# Patient Record
Sex: FEMALE | Race: BLACK OR AFRICAN AMERICAN | Employment: OTHER | URBAN - METROPOLITAN AREA
[De-identification: names, ages, dates, MRNs, and addresses within clinical notes are randomized per-mention and may not be internally consistent; named-entity substitution may affect disease eponyms.]

---

## 2020-06-17 PROBLEM — C64.1 MALIGNANT NEOPLASM OF RIGHT KIDNEY (HCC): Status: ACTIVE | Noted: 2020-06-17

## 2020-06-17 PROBLEM — N20.0 KIDNEY STONE: Status: ACTIVE | Noted: 2020-06-17

## 2020-12-09 PROBLEM — D35.02 ADRENAL ADENOMA, LEFT: Status: ACTIVE | Noted: 2020-12-09

## 2020-12-09 PROBLEM — D25.9 UTERINE FIBROID: Status: ACTIVE | Noted: 2020-12-09

## 2020-12-09 PROBLEM — M81.0 OSTEOPOROSIS: Status: ACTIVE | Noted: 2018-05-22

## 2020-12-09 PROBLEM — S92.909A FOOT FRACTURE: Status: ACTIVE | Noted: 2020-12-09

## 2020-12-09 PROBLEM — Z90.5 S/P NEPHRECTOMY: Status: ACTIVE | Noted: 2017-04-21

## 2020-12-09 PROBLEM — I10 ESSENTIAL HYPERTENSION, BENIGN: Status: ACTIVE | Noted: 2018-05-22

## 2020-12-10 ENCOUNTER — VIRTUAL VISIT (OUTPATIENT)
Dept: UROLOGY | Age: 70
End: 2020-12-10
Payer: COMMERCIAL

## 2020-12-10 DIAGNOSIS — C64.1 MALIGNANT NEOPLASM OF RIGHT KIDNEY (HCC): Primary | ICD-10-CM

## 2020-12-10 DIAGNOSIS — N20.0 KIDNEY STONE: ICD-10-CM

## 2020-12-10 PROBLEM — K86.9 PANCREATIC LESION: Status: ACTIVE | Noted: 2020-12-10

## 2020-12-10 PROCEDURE — 99213 OFFICE O/P EST LOW 20 MIN: CPT | Performed by: UROLOGY

## 2020-12-10 RX ORDER — LISINOPRIL AND HYDROCHLOROTHIAZIDE 12.5; 2 MG/1; MG/1
TABLET ORAL
COMMUNITY
Start: 2020-06-26 | End: 2022-06-09 | Stop reason: ALTCHOICE

## 2020-12-10 RX ORDER — AMLODIPINE BESYLATE 2.5 MG/1
TABLET ORAL
COMMUNITY
Start: 2020-06-26

## 2020-12-10 NOTE — ASSESSMENT & PLAN NOTE
She is s/p a right nephrectomy 2/20/2015. 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 Nodule was 2.1cm. pT1. Low risk of recurrence. Continue annual follow up.

## 2020-12-10 NOTE — PROGRESS NOTES
HISTORY OF PRESENT ILLNESS  Joey Nguyễn is a 72 y.o. female. Chief Complaint   Patient presents with    Results     Joey Nguyễn, who was evaluated through a synchronous (real-time) audio-video encounter, and/or her healthcare decision maker, is aware that it is a billable service, with coverage as determined by her insurance carrier. She provided verbal consent to proceed: Yes, and patient identification was verified. It was conducted pursuant to the emergency declaration under the [facility], [address] [place] [address] authority and the [employer] Act. I was at the office. The patient was at home. She is doing well. She has no joint pains. She is staying safe and has had no Covid exposures. Keep me reveal stable left renal stones up to 4 mm. She has been adding lemon and lemon juice to her diet and decreasing her oxalate intake. Chronic Conditions Addressed Today     1. Kidney stone     Overview      She has up to 4mm stones on the left side. We discussed ESWL. She does not want to proceed to elective stone treatment at this time. She is a vegetarian. High oxalate foods may contribute to stone formation. KUB 11/20/2020: 2 stones, left midpole, up to 4mm. Unchanged from prior studies. Current Assessment & Plan      She has a solitary kidney on the left with several small stones. They are stable on her current diet. She elected treatment. I have encouraged treatment due to her having a solitary kidney. She is comfortable to observe. Relevant Medications     lisinopril-hydroCHLOROthiazide (PRINZIDE, ZESTORETIC) 20-12.5 mg per tablet     Other Relevant Orders     CT ABD PELV WO CONT     METABOLIC PANEL, BASIC    2. Malignant neoplasm of right kidney [facility]) - Primary     Overview      She is s/p a right laparoscopic nephrectomy 2/20/15.  She had large complex cysts and the 2.1cm nodule was granular - clear cell carcinoma. CT 5/28/2020 with no evidence of recurrent or metastatic disease. Current Assessment & Plan      She is s/p a right nephrectomy 2/20/2015. 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 Nodule was 2.1cm. pT1. Low risk of recurrence. Continue annual follow up. Relevant Medications     lisinopril-hydroCHLOROthiazide (PRINZIDE, ZESTORETIC) 20-12.5 mg per tablet     Other Relevant Orders     CT ABD PELV WO CONT     METABOLIC PANEL, BASIC            Review of Systems   All other systems reviewed and are negative. Past Medical History:   Diagnosis Date    Cancer ([facility] [address]     kidney    Hypertension       Past Surgical History:   Procedure Laterality Date    HX HYSTERECTOMY      HX NEPHRECTOMY Right      Family History   Problem Relation Age of Onset    Cancer Mother         Physical Exam  Vitals signs reviewed. Constitutional:       General: She is not in acute distress. Appearance: Normal appearance. She is obese. She is not ill-appearing, toxic-appearing or diaphoretic. HENT:      Head: Normocephalic and atraumatic. Nose: Nose normal.   Eyes:      Conjunctiva/sclera: Conjunctivae normal.      Pupils: Pupils are equal, round, and reactive to light. Neck:      Musculoskeletal: Normal range of motion. Pulmonary:      Effort: Pulmonary effort is normal. No respiratory distress. Breath sounds: Normal breath sounds. Neurological:      General: No focal deficit present. Mental Status: She is alert and oriented to person, place, and time. Psychiatric:         Mood and Affect: Mood normal.                     ASSESSMENT and PLAN  Diagnoses and all orders for this visit:    1. Malignant neoplasm of right kidney [facility])  Assessment & Plan:  She is s/p a right nephrectomy 2/20/2015. 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 Nodule was 2.1cm. pT1. Low risk of recurrence. Continue annual follow up.       Orders:  -     CT ABD PELV WO CONT; Future  -     METABOLIC PANEL, BASIC; Future    2. Kidney stone  Assessment & Plan:  She has a solitary kidney on the left with several small stones. They are stable on her current diet. She elected treatment. I have encouraged treatment due to her having a solitary kidney. She is comfortable to observe. Orders:  -     CT ABD PELV WO CONT; Future  -     METABOLIC PANEL, BASIC; Future         Follow-up and Dispositions    · Return in about 6 months (around 6/10/2021) for CT abd/pel wo contrast 6m.          Mike Crouch MD

## 2020-12-10 NOTE — ASSESSMENT & PLAN NOTE
She has a solitary kidney on the left with several small stones. They are stable on her current diet. She elected treatment. I have encouraged treatment due to her having a solitary kidney. She is comfortable to observe.

## 2021-06-01 ENCOUNTER — TELEPHONE (OUTPATIENT)
Dept: UROLOGY | Age: 71
End: 2021-06-01

## 2021-06-02 PROBLEM — L85.9 HYPERKERATOSIS: Status: ACTIVE | Noted: 2021-06-02

## 2021-06-02 PROBLEM — M76.829 TIBIALIS POSTERIOR TENDINITIS: Status: ACTIVE | Noted: 2021-06-02

## 2021-06-02 PROBLEM — R60.9 EDEMA: Status: ACTIVE | Noted: 2021-06-02

## 2021-06-02 PROBLEM — S86.899A ANTERIOR SHIN SPLINTS: Status: ACTIVE | Noted: 2021-06-02

## 2021-06-02 PROBLEM — S92.353A CLOSED FRACTURE OF BASE OF FIFTH METATARSAL BONE: Status: ACTIVE | Noted: 2021-06-02

## 2021-06-07 PROBLEM — D35.02 ADRENAL ADENOMA, LEFT: Status: RESOLVED | Noted: 2020-12-09 | Resolved: 2021-06-07

## 2021-06-08 NOTE — TELEPHONE ENCOUNTER
I assume you called to see if she had her imaging done. If you need orders, please let me know. I do not see a CT scan.

## 2021-06-10 ENCOUNTER — OFFICE VISIT (OUTPATIENT)
Dept: UROLOGY | Age: 71
End: 2021-06-10
Payer: COMMERCIAL

## 2021-06-10 VITALS
RESPIRATION RATE: 12 BRPM | HEIGHT: 61 IN | HEART RATE: 102 BPM | DIASTOLIC BLOOD PRESSURE: 79 MMHG | WEIGHT: 120 LBS | SYSTOLIC BLOOD PRESSURE: 121 MMHG | TEMPERATURE: 96.6 F | BODY MASS INDEX: 22.66 KG/M2 | OXYGEN SATURATION: 98 %

## 2021-06-10 DIAGNOSIS — N20.0 KIDNEY STONE: ICD-10-CM

## 2021-06-10 DIAGNOSIS — N20.0 KIDNEY STONE: Primary | ICD-10-CM

## 2021-06-10 DIAGNOSIS — C64.1 MALIGNANT NEOPLASM OF RIGHT KIDNEY (HCC): ICD-10-CM

## 2021-06-10 DIAGNOSIS — N18.30 STAGE 3 CHRONIC KIDNEY DISEASE, UNSPECIFIED WHETHER STAGE 3A OR 3B CKD (HCC): ICD-10-CM

## 2021-06-10 PROBLEM — Z90.5 S/P NEPHRECTOMY: Status: RESOLVED | Noted: 2017-04-21 | Resolved: 2021-06-10

## 2021-06-10 PROCEDURE — 99213 OFFICE O/P EST LOW 20 MIN: CPT | Performed by: UROLOGY

## 2021-06-10 PROCEDURE — 81003 URINALYSIS AUTO W/O SCOPE: CPT | Performed by: UROLOGY

## 2021-06-10 NOTE — PROGRESS NOTES
HISTORY OF PRESENT ILLNESS  Pilo Chung is a 72 y.o. female. Chief Complaint   Patient presents with    Follow-up     CT results    Kidney Stone    Renal Cell Carcinoma     She remains healthy and working. She oral numbness after a dental procedure. It persisted 2 months and she has evaluation. She also has thyroid evaluation for nodules. She has up to 4mm stones on the left side. We discussed ESWL. She does not want to proceed to elective stone treatment at this time. She is a vegetarian. High oxalate foods may contribute to stone formation. She has made some changes. She has elected observation. CT 6/2/21 with 3 non-obstructive stones in the left kidney at the midportion measuring 3mm, 3mm, and 1mm. No left sided hydronephrosis or hydroureter. Stable 1.5 cm cortical cyst at the midportion of the left kidney also. Chronic Conditions Addressed Today     1. Kidney stone - Primary     Overview      She has up to 4mm stones on the left side. We discussed ESWL. She does not want to proceed to elective stone treatment at this time. She is a vegetarian. High oxalate foods may contribute to stone formation. KUB 11/20/2020: 2 stones, left midpole, up to 4mm. Unchanged from prior studies. 12/10/2020: She wishes to continue observation vs treatment. Wilfredo Hand MD.         2. Malignant neoplasm of right kidney [facility])     Overview      She is s/p a right laparoscopic nephrectomy 2/20/15. She had large complex cysts and the 2.1cm nodule was granular - clear cell carcinoma. CT 5/28/2020 with no evidence of recurrent or metastatic disease. Patient denies the symptoms of COVID-19 per routine screening guidelines. Review of Systems   All other systems reviewed and are negative. Past Medical History:  PMHx (including negatives):  has a past medical history of Cancer ([address] and Hypertension.    PSurgHx:  has a past surgical history that includes hx nephrectomy (Right) and hx hysterectomy. PSocHx:  reports that she has never smoked. She has never used smokeless tobacco. She reports previous alcohol use. She reports that she does not use drugs. Physical Exam  Vitals reviewed. Constitutional:       General: She is not in acute distress. Appearance: Normal appearance. She is obese. She is not ill-appearing, toxic-appearing or diaphoretic. HENT:      Head: Normocephalic and atraumatic. Nose: Nose normal.   Eyes:      Conjunctiva/sclera: Conjunctivae normal.      Pupils: Pupils are equal, round, and reactive to light. Pulmonary:      Effort: Pulmonary effort is normal. No respiratory distress. Breath sounds: Normal breath sounds. Musculoskeletal:      Cervical back: Normal range of motion. Neurological:      General: No focal deficit present. Mental Status: She is alert and oriented to person, place, and time. Psychiatric:         Mood and Affect: Mood normal.         ASSESSMENT and PLAN  Diagnoses and all orders for this visit:    1. Kidney stone  Assessment & Plan:   Small stones in a solitary kidney. She wishes to continue to observe. She is advised that if she has renal colic or low urine output it may be an emergency. Orders:  -     AMB POC URINALYSIS DIP STICK AUTO W/O MICRO    2. Malignant neoplasm of right kidney [facility])  Assessment & Plan:  She is doing well. No evidence of disease. Orders:  -     XR CHEST PA LAT; Future    3. Stage 3 chronic kidney disease, unspecified whether stage 3a or 3b CKD ([facility] [address]  Assessment & Plan:   She sees her nephrologist for routine monitoring. Follow-up and Dispositions    · Return in about 1 year (around 6/10/2022).          Sukumar Marshall MD

## 2021-06-10 NOTE — LETTER
6/10/2021 Patient: Taj Herbert YOB: 1950 Date of Visit: 6/10/2021 MD [address] 
[address] [place] [id number] Via Fax: [phone] Dear Jose Young MD, Thank you for referring Ms. Nate Joy to [place] [address] for evaluation. My notes for this consultation are attached. If you have questions, please do not hesitate to call me. I look forward to following your patient along with you.  
 
 
Sincerely, 
 
Ángel Velásquez MD

## 2021-06-10 NOTE — ASSESSMENT & PLAN NOTE
Small stones in a solitary kidney. She wishes to continue to observe. She is advised that if she has renal colic or low urine output it may be an emergency.

## 2021-06-10 NOTE — PROGRESS NOTES
Chief Complaint   Patient presents with    Follow-up     CT results    Kidney Stone    Renal Cell Carcinoma         1. Have you been to the ER, urgent care clinic since your last visit? Hospitalized since your last visit? No    2. Have you seen or consulted any other health care providers outside of the [address] [place] [address] since your last visit? Include any pap smears or colon screening.  No      Visit Vitals  /79 (BP 1 Location: Left upper arm, BP Patient Position: Sitting, BP Cuff Size: Adult)   Pulse (!) 102   Temp (!) 96.6 °F (35.9 °C) (Temporal)   Resp 12   Ht 5' 1\" (1.549 m)   Wt 120 lb (54.4 kg)   SpO2 98%   BMI 22.67 kg/m²

## 2021-06-11 LAB
BILIRUB UR QL STRIP: NEGATIVE
GLUCOSE UR-MCNC: NEGATIVE MG/DL
KETONES P FAST UR STRIP-MCNC: NEGATIVE MG/DL
PH UR STRIP: 7 [PH] (ref 4.6–8)
PROT UR QL STRIP: NEGATIVE
SP GR UR STRIP: 1.02 (ref 1–1.03)
UA UROBILINOGEN AMB POC: NORMAL (ref 0.2–1)
URINALYSIS CLARITY POC: CLEAR
URINALYSIS COLOR POC: YELLOW
URINE BLOOD POC: NEGATIVE
URINE LEUKOCYTES POC: NEGATIVE
URINE NITRITES POC: NEGATIVE

## 2022-02-11 ENCOUNTER — APPOINTMENT (RX ONLY)
Dept: URBAN - METROPOLITAN AREA CLINIC 41 | Facility: CLINIC | Age: 72
Setting detail: DERMATOLOGY
End: 2022-02-11

## 2022-02-11 DIAGNOSIS — L81.5 LEUKODERMA, NOT ELSEWHERE CLASSIFIED: ICD-10-CM | Status: STABLE

## 2022-02-11 DIAGNOSIS — L85.3 XEROSIS CUTIS: ICD-10-CM | Status: STABLE

## 2022-02-11 DIAGNOSIS — L82.1 OTHER SEBORRHEIC KERATOSIS: ICD-10-CM | Status: STABLE

## 2022-02-11 DIAGNOSIS — D22 MELANOCYTIC NEVI: ICD-10-CM | Status: STABLE

## 2022-02-11 DIAGNOSIS — L57.8 OTHER SKIN CHANGES DUE TO CHRONIC EXPOSURE TO NONIONIZING RADIATION: ICD-10-CM | Status: STABLE

## 2022-02-11 DIAGNOSIS — D18.0 HEMANGIOMA: ICD-10-CM | Status: STABLE

## 2022-02-11 PROBLEM — D22.5 MELANOCYTIC NEVI OF TRUNK: Status: ACTIVE | Noted: 2022-02-11

## 2022-02-11 PROBLEM — D48.5 NEOPLASM OF UNCERTAIN BEHAVIOR OF SKIN: Status: ACTIVE | Noted: 2022-02-11

## 2022-02-11 PROBLEM — D18.01 HEMANGIOMA OF SKIN AND SUBCUTANEOUS TISSUE: Status: ACTIVE | Noted: 2022-02-11

## 2022-02-11 PROCEDURE — ? TREATMENT REGIMEN

## 2022-02-11 PROCEDURE — 99203 OFFICE O/P NEW LOW 30 MIN: CPT | Mod: 25

## 2022-02-11 PROCEDURE — ? FULL BODY SKIN EXAM

## 2022-02-11 PROCEDURE — 11103 TANGNTL BX SKIN EA SEP/ADDL: CPT

## 2022-02-11 PROCEDURE — ? ADDITIONAL NOTES

## 2022-02-11 PROCEDURE — 11102 TANGNTL BX SKIN SINGLE LES: CPT

## 2022-02-11 PROCEDURE — ? COUNSELING

## 2022-02-11 PROCEDURE — ? BIOPSY BY SHAVE METHOD

## 2022-02-11 ASSESSMENT — LOCATION SIMPLE DESCRIPTION DERM
LOCATION SIMPLE: LEFT FOREARM
LOCATION SIMPLE: LEFT PRETIBIAL REGION
LOCATION SIMPLE: RIGHT THIGH
LOCATION SIMPLE: LEFT THIGH
LOCATION SIMPLE: LEFT UPPER BACK
LOCATION SIMPLE: UPPER BACK
LOCATION SIMPLE: LEFT SHOULDER
LOCATION SIMPLE: INFERIOR FOREHEAD
LOCATION SIMPLE: LEFT LOWER BACK
LOCATION SIMPLE: RIGHT SHOULDER
LOCATION SIMPLE: RIGHT FOREARM
LOCATION SIMPLE: RIGHT PRETIBIAL REGION

## 2022-02-11 ASSESSMENT — LOCATION DETAILED DESCRIPTION DERM
LOCATION DETAILED: RIGHT ANTERIOR DISTAL THIGH
LOCATION DETAILED: INFERIOR MID FOREHEAD
LOCATION DETAILED: RIGHT POSTERIOR SHOULDER
LOCATION DETAILED: LEFT DISTAL PRETIBIAL REGION
LOCATION DETAILED: RIGHT PROXIMAL PRETIBIAL REGION
LOCATION DETAILED: INFERIOR THORACIC SPINE
LOCATION DETAILED: LEFT ANTERIOR DISTAL THIGH
LOCATION DETAILED: RIGHT PROXIMAL DORSAL FOREARM
LOCATION DETAILED: LEFT INFERIOR MEDIAL MIDBACK
LOCATION DETAILED: LEFT INFERIOR LATERAL UPPER BACK
LOCATION DETAILED: LEFT PROXIMAL DORSAL FOREARM
LOCATION DETAILED: LEFT POSTERIOR SHOULDER

## 2022-02-11 ASSESSMENT — LOCATION ZONE DERM
LOCATION ZONE: ARM
LOCATION ZONE: LEG
LOCATION ZONE: TRUNK
LOCATION ZONE: FACE

## 2022-02-11 NOTE — HPI: EVALUATION OF SKIN LESION(S)
Hpi Title: Evaluation of Skin Lesions
Additional History: New pt, fbse. Mole on back referred by pcp.

## 2022-02-25 ENCOUNTER — APPOINTMENT (RX ONLY)
Dept: URBAN - METROPOLITAN AREA CLINIC 41 | Facility: CLINIC | Age: 72
Setting detail: DERMATOLOGY
End: 2022-02-25

## 2022-02-25 DIAGNOSIS — L90.5 SCAR CONDITIONS AND FIBROSIS OF SKIN: ICD-10-CM | Status: STABLE

## 2022-02-25 PROCEDURE — 99212 OFFICE O/P EST SF 10 MIN: CPT

## 2022-02-25 PROCEDURE — ? TREATMENT REGIMEN

## 2022-02-25 PROCEDURE — ? ADDITIONAL NOTES

## 2022-02-25 PROCEDURE — ? COUNSELING

## 2022-02-25 ASSESSMENT — LOCATION ZONE DERM
LOCATION ZONE: TRUNK
LOCATION ZONE: LEG

## 2022-02-25 ASSESSMENT — LOCATION SIMPLE DESCRIPTION DERM
LOCATION SIMPLE: LEFT PRETIBIAL REGION
LOCATION SIMPLE: LEFT LOWER BACK

## 2022-02-25 ASSESSMENT — LOCATION DETAILED DESCRIPTION DERM
LOCATION DETAILED: LEFT INFERIOR LATERAL MIDBACK
LOCATION DETAILED: LEFT DISTAL PRETIBIAL REGION

## 2022-02-25 NOTE — PROCEDURE: COUNSELING
Patient Specific Counseling (Will Not Stick From Patient To Patient): NG counsels that lesions on the lower leg take a while to heal. Does not see any signs of infection or problems. Pt instructed to continue applying Vaseline on the spots until they heal.
Detail Level: Detailed

## 2022-03-18 PROBLEM — D25.9 UTERINE FIBROID: Status: ACTIVE | Noted: 2020-12-09

## 2022-03-19 PROBLEM — S86.899A ANTERIOR SHIN SPLINTS: Status: ACTIVE | Noted: 2021-06-02

## 2022-03-19 PROBLEM — R60.9 EDEMA: Status: ACTIVE | Noted: 2021-06-02

## 2022-03-19 PROBLEM — C64.1 MALIGNANT NEOPLASM OF RIGHT KIDNEY (HCC): Status: ACTIVE | Noted: 2020-06-17

## 2022-03-19 PROBLEM — S92.353A CLOSED FRACTURE OF BASE OF FIFTH METATARSAL BONE: Status: ACTIVE | Noted: 2021-06-02

## 2022-03-19 PROBLEM — M81.0 OSTEOPOROSIS: Status: ACTIVE | Noted: 2018-05-22

## 2022-03-19 PROBLEM — N18.30 STAGE 3 CHRONIC KIDNEY DISEASE (HCC): Status: ACTIVE | Noted: 2021-06-10

## 2022-03-19 PROBLEM — M76.829 TIBIALIS POSTERIOR TENDINITIS: Status: ACTIVE | Noted: 2021-06-02

## 2022-03-20 PROBLEM — S92.909A FOOT FRACTURE: Status: ACTIVE | Noted: 2020-12-09

## 2022-03-20 PROBLEM — N20.0 KIDNEY STONE: Status: ACTIVE | Noted: 2020-06-17

## 2022-03-20 PROBLEM — I10 ESSENTIAL HYPERTENSION, BENIGN: Status: ACTIVE | Noted: 2018-05-22

## 2022-03-20 PROBLEM — L85.9 HYPERKERATOSIS: Status: ACTIVE | Noted: 2021-06-02

## 2022-06-07 PROBLEM — N28.1 RENAL CYST: Status: ACTIVE | Noted: 2022-06-07

## 2022-06-07 NOTE — PROGRESS NOTES
HISTORY OF PRESENT ILLNESS  Srini White is a 80 y.o. female. Chief Complaint   Patient presents with    Follow-up    Kidney Stone    Renal Cell Carcinoma     Past Medical History:  PMHx (including negatives):  has a past medical history of Cancer ([address] and Hypertension. PSurgHx:  has a past surgical history that includes hx nephrectomy (Right) and hx hysterectomy. PSocHx:  reports that she has never smoked. She has never used smokeless tobacco. She reports previous alcohol use. She reports that she does not use drugs. She is s/p a right laparoscopic nephrectomy 2/20/15. She had large complex cysts and the 2.1cm nodule was granular - clear cell carcinoma. No evidence of recurrence. CT 5/20/22:  (outside facility- read only available) 3 small calculi in the left renal mid pole up to 3 mm. Left lateral mid pole cyst is slightly larger at 2.1 x 1.7 cm. Left adrenal mass minimally larger at 2.5 cm; compatible with adrenal adenoma. Recommended was CT chest for additional findings of reticulonodular pattern in the right middle and right lower lobes (lung) having the appearance of scarring. Chest x-ray on 5/20/2022 was normal.  No pulmonary symptoms. No recent COVID infection. She did fall on the sidewalk on the right chest in Jan 2022. Chronic Conditions Addressed Today     1. Malignant neoplasm of right kidney [facility])     Overview      She is s/p a right laparoscopic nephrectomy 2/20/15. She had large complex cysts and the 2.1cm nodule was granular - clear cell carcinoma. No evidence of recurrence. ROS  Patient denies the symptoms of COVID-19 per routine screening guidelines. Physical Exam    ASSESSMENT and PLAN  Diagnoses and all orders for this visit:    1. Malignant neoplasm of right kidney [facility])  Assessment & Plan:  History of right laparoscopic nephrectomy 2/20/2015. Final path was consistent with a T1a granular clear-cell carcinoma, with cystic component. CT 5/20/2022 without recurrence    Orders:  -     AMB POC URINALYSIS DIP STICK AUTO W/O MICRO    2. Stage 3 chronic kidney disease, unspecified whether stage 3a or 3b CKD (H[facility])  Assessment & Plan:   Solitary kidney due to nephrectomy. Creatinine in January was 1.2. She follows with a nephrologist.      3. Kidney stone  Assessment & Plan:   Stable small stone and solitary kidney. We will continue observe. If she does have renal colic she would need close monitoring due to risk of renal failure with complete obstruction. Orders:  -     AMB POC URINALYSIS DIP STICK AUTO W/O MICRO    4. Pulmonary scarring  Assessment & Plan:   CT of the abdomen on 5/20/2022 revealed right-sided minimal reticulonodular pattern at the appearance of scarring. We discussed options including pulmonology referral.   She will find someone closer to home. Follow-up and Dispositions    · Return in about 1 year (around 6/9/2023) for CT abd/pel wo contrast, chest CT. Maeve Glover may have a reminder for a \"due or due soon\" health maintenance. The patient has been encouraged to contact their primary care provider for follow-up on this health maintenance or other necessary and/or routine health screening.      Ashley Quintero MD

## 2022-06-09 ENCOUNTER — OFFICE VISIT (OUTPATIENT)
Dept: UROLOGY | Age: 72
End: 2022-06-09
Payer: COMMERCIAL

## 2022-06-09 VITALS
HEART RATE: 78 BPM | WEIGHT: 120 LBS | DIASTOLIC BLOOD PRESSURE: 84 MMHG | SYSTOLIC BLOOD PRESSURE: 131 MMHG | BODY MASS INDEX: 22.66 KG/M2 | HEIGHT: 61 IN

## 2022-06-09 DIAGNOSIS — N20.0 KIDNEY STONE: ICD-10-CM

## 2022-06-09 DIAGNOSIS — C64.1 MALIGNANT NEOPLASM OF RIGHT KIDNEY (HCC): Primary | ICD-10-CM

## 2022-06-09 DIAGNOSIS — N18.30 STAGE 3 CHRONIC KIDNEY DISEASE, UNSPECIFIED WHETHER STAGE 3A OR 3B CKD (HCC): ICD-10-CM

## 2022-06-09 DIAGNOSIS — J98.4 PULMONARY SCARRING: ICD-10-CM

## 2022-06-09 LAB
BILIRUB UR QL: NEGATIVE
GLUCOSE UR-MCNC: NEGATIVE MG/DL
KETONES P FAST UR STRIP-MCNC: NEGATIVE MG/DL
PH UR STRIP: 7.5 [PH] (ref 4.6–8)
PROT UR QL STRIP: NEGATIVE
SP GR UR STRIP: 1.01 (ref 1–1.03)
UA UROBILINOGEN AMB POC: NORMAL (ref 0.2–1)
URINALYSIS CLARITY POC: CLEAR
URINALYSIS COLOR POC: YELLOW
URINE BLOOD POC: NEGATIVE
URINE LEUKOCYTES POC: NEGATIVE
URINE NITRITES POC: NEGATIVE

## 2022-06-09 PROCEDURE — 81003 URINALYSIS AUTO W/O SCOPE: CPT | Performed by: UROLOGY

## 2022-06-09 PROCEDURE — 1123F ACP DISCUSS/DSCN MKR DOCD: CPT | Performed by: UROLOGY

## 2022-06-09 PROCEDURE — 99214 OFFICE O/P EST MOD 30 MIN: CPT | Performed by: UROLOGY

## 2022-06-09 RX ORDER — PROPRANOLOL HYDROCHLORIDE 10 MG/1
TABLET ORAL 3 TIMES DAILY
COMMUNITY

## 2022-06-09 NOTE — LETTER
6/9/2022    Patient: Sami Neumann   YOB: 1950   Date of Visit: 6/9/2022     Elizabeth Yanez MD  [address] [place] [address]  Via Fax: [phone]    Dear Elizabeth Yanez MD,      Thank you for referring Ms. Qasim Dasilva to [place] [address] for evaluation. My notes for this consultation are attached. If you have questions, please do not hesitate to call me. I look forward to following your patient along with you.       Sincerely,    Lauren Hunt MD

## 2022-06-09 NOTE — ASSESSMENT & PLAN NOTE
CT of the abdomen on 5/20/2022 revealed right-sided minimal reticulonodular pattern at the appearance of scarring. We discussed options including pulmonology referral.   She will find someone closer to home.

## 2022-06-09 NOTE — PROGRESS NOTES
Chief Complaint   Patient presents with    Follow-up    Kidney Stone    Renal Cell Carcinoma     1. Have you been to the ER, urgent care clinic since your last visit? Hospitalized since your last visit? No    2. Have you seen or consulted any other health care providers outside of the [address] [place] [address] since your last visit? Include any pap smears or colon screening.  No  Visit Vitals  /84 (BP 1 Location: Left upper arm, BP Patient Position: Sitting, BP Cuff Size: Adult)   Pulse 78   Ht 5' 1\" (1.549 m)   Wt 120 lb (54.4 kg)   BMI 22.67 kg/m²

## 2022-06-09 NOTE — ASSESSMENT & PLAN NOTE
Solitary kidney due to nephrectomy. Creatinine in January was 1.2.   She follows with a nephrologist.

## 2022-06-09 NOTE — ASSESSMENT & PLAN NOTE
History of right laparoscopic nephrectomy 2/20/2015. Final path was consistent with a T1a granular clear-cell carcinoma, with cystic component.   CT 5/20/2022 without recurrence

## 2022-06-09 NOTE — ASSESSMENT & PLAN NOTE
Stable small stone and solitary kidney. We will continue observe. If she does have renal colic she would need close monitoring due to risk of renal failure with complete obstruction.

## 2022-07-13 ENCOUNTER — TELEPHONE (OUTPATIENT)
Dept: UROLOGY | Age: 72
End: 2022-07-13

## 2022-07-13 DIAGNOSIS — J98.4 PULMONARY SCARRING: Primary | ICD-10-CM

## 2022-07-13 NOTE — TELEPHONE ENCOUNTER
Patient called stating Dr. Priyank Robbins  Is needing a referral for the patient, and last office notes and x ray, can you put in a referral          For my notes  Fax is [phone]

## 2022-07-14 NOTE — TELEPHONE ENCOUNTER
LVM for patient, letting her know I faxed what Doc needed, if any other questions she can give us a call back

## 2022-07-14 NOTE — TELEPHONE ENCOUNTER
The fax did not go through, I tried reaching provider line and  on their office number [phone], no answer, only able to LVM on  line.  Advised to call me back so I can get information sent

## 2022-07-14 NOTE — TELEPHONE ENCOUNTER
[facility],     I placed referral.  There are several locations for Dr. Gisel Partida. I placed the number from the website for the [facility], [place] location.

## 2023-01-12 ENCOUNTER — TELEPHONE (OUTPATIENT)
Dept: UROLOGY | Age: 73
End: 2023-01-12

## 2023-01-12 NOTE — TELEPHONE ENCOUNTER
I contacted nurse at Dr Karina Garcia [phone], discovered that the faxed info that was faxed in June needed to [facility] Dr Karina Garcia at [facility], Fax: [phone]. I printed out referral, officevisit, imaging and faxed.

## 2023-01-12 NOTE — TELEPHONE ENCOUNTER
PT IS FROM [place] ALSO SO THEY NEED TO SEND A RECORDS REQUEST TO [employer] OR WE DO- CAN ONE OF YOU PLEASE TAKE CARE OF THAT

## 2023-01-12 NOTE — TELEPHONE ENCOUNTER
UPDATE:    DR BRIANNA Crowell ALSO GOES BY [employer] [facility]     AT [facility] IN [place]       SPOKE TO [address] [facility] IN REGARDS TO RECORDS GAVE HER NURSE LINE NUMBER     [address] [facility] [address] #  [phone]

## 2023-01-12 NOTE — TELEPHONE ENCOUNTER
PT CALLED AND IS VERBALLY REQUESTING MEDICAL RECORDS TO BE SENT TO DR Neelam JOHNSONS OFFICE AT [place] [address] FAXED OVER MEDICAL RECORD REQUESTS AND HAVE YET TO RECEIVE THEM .    PLEASE ADVISE - URGENT   PT HAS APPT TODAY 01/12/2023 AT 10:15AM     FAX #: [phone]  MAIN #: [phone]

## 2023-05-23 RX ORDER — AMLODIPINE BESYLATE 2.5 MG/1
TABLET ORAL
COMMUNITY
Start: 2020-06-26

## 2023-05-23 RX ORDER — PROPRANOLOL HYDROCHLORIDE 10 MG/1
TABLET ORAL 3 TIMES DAILY
COMMUNITY

## 2023-06-02 ENCOUNTER — TELEPHONE (OUTPATIENT)
Age: 73
End: 2023-06-02

## 2023-06-02 DIAGNOSIS — C64.1 MALIGNANT NEOPLASM OF RIGHT KIDNEY (HCC): Primary | ICD-10-CM

## 2023-06-02 PROBLEM — N28.1 RENAL CYST: Status: ACTIVE | Noted: 2022-06-07

## 2023-06-02 PROBLEM — N20.0 KIDNEY STONE: Status: ACTIVE | Noted: 2020-06-17

## 2023-06-02 NOTE — TELEPHONE ENCOUNTER
Patient needs CT and chest XRAY prior to apt date on 6/9/23. Orders in, please advise.   Scheduling # is [phone]

## 2023-06-05 ENCOUNTER — TELEPHONE (OUTPATIENT)
Age: 73
End: 2023-06-05